# Patient Record
Sex: FEMALE | Race: WHITE | NOT HISPANIC OR LATINO | URBAN - METROPOLITAN AREA
[De-identification: names, ages, dates, MRNs, and addresses within clinical notes are randomized per-mention and may not be internally consistent; named-entity substitution may affect disease eponyms.]

---

## 2020-06-09 ENCOUNTER — OFFICE VISIT (OUTPATIENT)
Dept: URBAN - METROPOLITAN AREA CLINIC 54 | Facility: CLINIC | Age: 70
End: 2020-06-09
Payer: MEDICARE

## 2020-06-09 DIAGNOSIS — R63.4 ABNORMAL WEIGHT LOSS: ICD-10-CM

## 2020-06-09 DIAGNOSIS — E46 MALNUTRITION, UNSPECIFIED TYPE: ICD-10-CM

## 2020-06-09 DIAGNOSIS — K90.2 BLIND LOOP SYNDROME: ICD-10-CM

## 2020-06-09 DIAGNOSIS — K74.0 LIVER FIBROSIS: ICD-10-CM

## 2020-06-09 DIAGNOSIS — R19.7 ACUTE DIARRHEA: ICD-10-CM

## 2020-06-09 DIAGNOSIS — N18.4 CKD (CHRONIC KIDNEY DISEASE) STAGE 4, GFR 15-29 ML/MIN: ICD-10-CM

## 2020-06-09 DIAGNOSIS — A04.72 CLOSTRIDIUM DIFFICILE COLITIS: ICD-10-CM

## 2020-06-09 DIAGNOSIS — K63.89 BACTERIAL OVERGROWTH SYNDROME: ICD-10-CM

## 2020-06-09 DIAGNOSIS — K52.9 CHRONIC DIARRHEA: ICD-10-CM

## 2020-06-09 DIAGNOSIS — N20.0 KIDNEY STONE: ICD-10-CM

## 2020-06-09 DIAGNOSIS — Z98.84 GASTRIC BYPASS STATUS FOR OBESITY: ICD-10-CM

## 2020-06-09 DIAGNOSIS — K59.9 MALDIGESTION SYNDROME: ICD-10-CM

## 2020-06-09 PROCEDURE — 99214 OFFICE O/P EST MOD 30 MIN: CPT | Performed by: INTERNAL MEDICINE

## 2020-06-09 PROCEDURE — G9903 PT SCRN TBCO ID AS NON USER: HCPCS | Performed by: INTERNAL MEDICINE

## 2020-06-09 PROCEDURE — 1036F TOBACCO NON-USER: CPT | Performed by: INTERNAL MEDICINE

## 2020-06-09 RX ORDER — MEGESTEROL ACETATE 125 MG/ML
TAKE 5 MILLILITERS (625 MG) BY ORAL ROUTE EVERY 24 HOURS FOR 30 DAYS SUSPENSION ORAL 1
Qty: 150 | Refills: 1 | Status: ACTIVE | COMMUNITY
Start: 2020-05-11 | End: 2020-07-10

## 2020-06-09 RX ORDER — RIFAXIMIN 550 MG/1
TAKE 1 TABLET (550 MG) BY ORAL ROUTE 2 TIMES PER DAY FOR 90 DAYS TABLET ORAL 2
Qty: 180 | Refills: 3 | Status: ACTIVE | COMMUNITY
Start: 2020-04-20 | End: 2021-04-15

## 2020-06-09 RX ORDER — LEVOTHYROXINE SODIUM 0.15 MG/1
TABLET ORAL
Qty: 0 | Refills: 0 | Status: ACTIVE | COMMUNITY
Start: 1900-01-01 | End: 1900-01-01

## 2020-06-09 NOTE — PHYSICAL EXAM GASTROINTESTINAL
Abdomen , soft, nontender, nondistended , no guarding or rigidity , no masses palpable , normal bowel sounds, healed scar , Liver and Spleen , no hepatomegaly present , no hepatosplenomegaly , liver nontender , spleen not palpable

## 2020-06-09 NOTE — HPI-OTHER HISTORIES
This is a follow-up appointment for this patient, a 69 year old /White female, after a previous visit on hospitalization at [facility]. Pt with hx of CKD stage 5, off HD (refused). REcurrent CDI with 3 rounds of  vancomycin. Still with diarrhea. Some improvement with steroids., for an evaluation for diarrhea. The patient has been experiencing these symptoms for months.  Diagnostic testing includes a flexible sigmoidoscopy. The flexible sigmoidoscopy was performed on 02/23/2020 by Anamika Stockton MD and was normal.    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 on steroid taper  4-2-20 S/p FMT with persistent diarrhea. Pt reports that the bowel movements went up to 14. Pt increased prednisone.  Pt reports that she then went 4-6 per day.  Pt reports 3 stools per day. Pt reports that ID indicated that the patient would not be approved.  Pt reports that  Follow Up 4/20/20: Patient presents for routine post hospitalization telehealth visit. Patient complains of rectal outlet pain which is worse during and after a BM. No abdominal pain. She has used steroid suppositories and NTG/Lidocaine. Diarrhea is ? better with Creon 3K 3 caps with each meal and 2 with snack. FMT did not work. She is using Imodium as needed. Flagyl has been discontinued. Weight is down to 120 lbs. Liver biopsy showed F3 disease.  Follow Up 5/11/20: Patient presents for routine telehealth visit. Patient feels better. She has not taken Creon for 2 weeks due to coverage issues. This made no difference. She tried the 4 caps/meal dose without any significant improvement. However, she wants to try for 2 weeks with drug assistance. She has gained 2 lbs. She is managing symptoms with Imodium. She c/o bloating and gas. She still has rectal pain but improved by 40%.  Follow Up 6/9/20: Patient presents for routine follow up. She continues to have diarrhea and rectal burning despite using the NTG/Lidocaine ointments. She has intermittent incontinence episodes. She was in ER 2 weeks ago for chest pains and dyspnea. She was found to have Hb of 8.4 and received 1  U of PRBC with improvement in symptoms. She had EKG and negative cardiac enzymes. She has MPI in next fee weeks. She is back on Creon 3 caps with each meal. She was started on Rifaximin in May 2020 with improvement in bloating.

## 2020-06-15 ENCOUNTER — OFFICE VISIT (OUTPATIENT)
Dept: URBAN - METROPOLITAN AREA CLINIC 54 | Facility: CLINIC | Age: 70
End: 2020-06-15

## 2020-06-17 ENCOUNTER — LAB OUTSIDE AN ENCOUNTER (OUTPATIENT)
Dept: URBAN - METROPOLITAN AREA CLINIC 54 | Facility: CLINIC | Age: 70
End: 2020-06-17

## 2020-06-21 LAB
5-HIAA, URINE, 24HR: 1.8
5-HIAA, URINE: 1.4

## 2020-06-22 ENCOUNTER — TELEPHONE ENCOUNTER (OUTPATIENT)
Dept: URBAN - METROPOLITAN AREA CLINIC 54 | Facility: CLINIC | Age: 70
End: 2020-06-22

## 2020-06-22 RX ORDER — DIPHENOXYLATE HYDROCHLORIDE AND ATROPINE SULFATE 2.5; .025 MG/1; MG/1
1 TABLET AS NEEDED TABLET ORAL
Qty: 60 TABLET | Refills: 1 | OUTPATIENT
Start: 2020-06-23 | End: 2020-08-22

## 2020-06-22 RX ORDER — MEGESTEROL ACETATE 125 MG/ML
TAKE 5 MILLILITERS (625 MG) BY ORAL ROUTE EVERY 24 HOURS FOR 30 DAYS SUSPENSION ORAL 1
Qty: 150 | Refills: 1 | Status: ACTIVE | COMMUNITY
Start: 2020-05-11 | End: 2020-07-10

## 2020-06-22 RX ORDER — RIFAXIMIN 550 MG/1
TAKE 1 TABLET (550 MG) BY ORAL ROUTE 2 TIMES PER DAY FOR 90 DAYS TABLET ORAL 2
Qty: 180 | Refills: 3 | Status: ACTIVE | COMMUNITY
Start: 2020-04-20 | End: 2021-04-15

## 2020-06-22 RX ORDER — LEVOTHYROXINE SODIUM 0.15 MG/1
TABLET ORAL
Qty: 0 | Refills: 0 | Status: ACTIVE | COMMUNITY
Start: 1900-01-01 | End: 1900-01-01

## 2020-06-25 ENCOUNTER — TELEPHONE ENCOUNTER (OUTPATIENT)
Dept: URBAN - METROPOLITAN AREA CLINIC 54 | Facility: CLINIC | Age: 70
End: 2020-06-25

## 2020-06-28 LAB
A/G RATIO: 1.9
ALBUMIN: 3.6
ALKALINE PHOSPHATASE: 70
ALT (SGPT): 31
AST (SGOT): 21
BASO (ABSOLUTE): 0
BASOS: 0
BILIRUBIN, TOTAL: 0.2
BUN/CREATININE RATIO: 14
BUN: 34
CALCIUM: 8.8
CARBON DIOXIDE, TOTAL: 15
CHLORIDE: 113
CHROMOGRANIN A: 8945
CREATININE: 2.4
EGFR IF AFRICN AM: 23
EGFR IF NONAFRICN AM: 20
EOS (ABSOLUTE): 0.1
EOS: 1
GASTRIN, SERUM: 179
GLOBULIN, TOTAL: 1.9
GLUCOSE: 95
HEMATOCRIT: 33.3
HEMATOLOGY COMMENTS:: (no result)
HEMOGLOBIN: 10.3
IMMATURE CELLS: (no result)
IMMATURE GRANS (ABS): 0
IMMATURE GRANULOCYTES: 0
LYMPHS (ABSOLUTE): 1.9
LYMPHS: 27
MCH: 28.4
MCHC: 30.9
MCV: 92
MONOCYTES(ABSOLUTE): 0.3
MONOCYTES: 5
NEUTROPHILS (ABSOLUTE): 4.7
NEUTROPHILS: 67
NRBC: (no result)
PLATELETS: 169
POTASSIUM: 4.2
PROTEIN, TOTAL: 5.5
RBC: 3.63
RDW: 14.7
SODIUM: 139
SOMATOSTATIN: 34
VIP, PLASMA: 46.7
WBC: 6.9
ZINC, PLASMA OR SERUM: 70

## 2020-07-01 ENCOUNTER — TELEPHONE ENCOUNTER (OUTPATIENT)
Dept: URBAN - METROPOLITAN AREA CLINIC 92 | Facility: CLINIC | Age: 70
End: 2020-07-01

## 2020-07-06 ENCOUNTER — TELEPHONE ENCOUNTER (OUTPATIENT)
Dept: URBAN - METROPOLITAN AREA CLINIC 54 | Facility: CLINIC | Age: 70
End: 2020-07-06

## 2020-07-20 ENCOUNTER — OFFICE VISIT (OUTPATIENT)
Dept: URBAN - METROPOLITAN AREA CLINIC 54 | Facility: CLINIC | Age: 70
End: 2020-07-20
Payer: MEDICARE

## 2020-07-20 DIAGNOSIS — R19.7 ACUTE DIARRHEA: ICD-10-CM

## 2020-07-20 DIAGNOSIS — K62.89 ANAL OR RECTAL PAIN: ICD-10-CM

## 2020-07-20 PROCEDURE — 99214 OFFICE O/P EST MOD 30 MIN: CPT | Performed by: INTERNAL MEDICINE

## 2020-07-20 RX ORDER — DIPHENOXYLATE HYDROCHLORIDE AND ATROPINE SULFATE 2.5; .025 MG/1; MG/1
1 TABLET AS NEEDED TABLET ORAL
Qty: 60 TABLET | Refills: 1 | Status: ACTIVE | COMMUNITY
Start: 2020-06-23 | End: 2020-08-22

## 2020-07-20 RX ORDER — LEVOTHYROXINE SODIUM 0.15 MG/1
TABLET ORAL
Qty: 0 | Refills: 0 | Status: ACTIVE | COMMUNITY
Start: 1900-01-01

## 2020-07-20 RX ORDER — DIPHENOXYLATE HYDROCHLORIDE AND ATROPINE SULFATE 2.5; .025 MG/1; MG/1
2 TABLETS AS NEEDED TABLET ORAL
Qty: 120 TABLET | Refills: 1
Start: 2020-06-23 | End: 2020-08-22

## 2020-07-20 RX ORDER — RIFAXIMIN 550 MG/1
TAKE 1 TABLET (550 MG) BY ORAL ROUTE 2 TIMES PER DAY FOR 90 DAYS TABLET ORAL 2
Qty: 180 | Refills: 3 | Status: ACTIVE | COMMUNITY
Start: 2020-04-20 | End: 2021-04-15

## 2020-08-19 ENCOUNTER — TELEPHONE ENCOUNTER (OUTPATIENT)
Dept: URBAN - METROPOLITAN AREA CLINIC 54 | Facility: CLINIC | Age: 70
End: 2020-08-19

## 2020-08-24 ENCOUNTER — OUT OF OFFICE VISIT (OUTPATIENT)
Dept: URBAN - METROPOLITAN AREA MEDICAL CENTER 23 | Facility: MEDICAL CENTER | Age: 70
End: 2020-08-24
Payer: MEDICARE

## 2020-08-24 DIAGNOSIS — K52.9 ACUTE COLITIS: ICD-10-CM

## 2020-08-24 DIAGNOSIS — U07.1 COVID-19: ICD-10-CM

## 2020-08-24 DIAGNOSIS — R63.4 ABNORMAL INTENTIONAL WEIGHT LOSS: ICD-10-CM

## 2020-08-24 DIAGNOSIS — E46 CALORIC MALNUTRITION: ICD-10-CM

## 2020-08-24 DIAGNOSIS — K74.60 ADVANCED CIRRHOSIS OF LIVER: ICD-10-CM

## 2020-08-24 PROCEDURE — 99233 SBSQ HOSP IP/OBS HIGH 50: CPT | Performed by: INTERNAL MEDICINE

## 2020-08-24 PROCEDURE — G8427 DOCREV CUR MEDS BY ELIG CLIN: HCPCS | Performed by: INTERNAL MEDICINE

## 2020-08-24 PROCEDURE — 99223 1ST HOSP IP/OBS HIGH 75: CPT | Performed by: INTERNAL MEDICINE

## 2020-10-09 ENCOUNTER — OFFICE VISIT (OUTPATIENT)
Dept: URBAN - METROPOLITAN AREA CLINIC 54 | Facility: CLINIC | Age: 70
End: 2020-10-09
Payer: MEDICARE

## 2020-10-09 ENCOUNTER — WEB ENCOUNTER (OUTPATIENT)
Dept: URBAN - METROPOLITAN AREA CLINIC 54 | Facility: CLINIC | Age: 70
End: 2020-10-09

## 2020-10-09 DIAGNOSIS — K74.00 LIVER FIBROSIS: ICD-10-CM

## 2020-10-09 DIAGNOSIS — N18.4 CKD (CHRONIC KIDNEY DISEASE) STAGE 4, GFR 15-29 ML/MIN: ICD-10-CM

## 2020-10-09 DIAGNOSIS — K52.9 CHRONIC DIARRHEA: ICD-10-CM

## 2020-10-09 DIAGNOSIS — A04.72 CLOSTRIDIUM DIFFICILE COLITIS: ICD-10-CM

## 2020-10-09 PROCEDURE — 99213 OFFICE O/P EST LOW 20 MIN: CPT | Performed by: INTERNAL MEDICINE

## 2020-10-09 RX ORDER — RIFAXIMIN 550 MG/1
TAKE 1 TABLET (550 MG) BY ORAL ROUTE 2 TIMES PER DAY FOR 90 DAYS TABLET ORAL 2
Qty: 180 | Refills: 3 | Status: ACTIVE | COMMUNITY
Start: 2020-04-20 | End: 2021-04-15

## 2020-10-09 RX ORDER — LEVOTHYROXINE SODIUM 0.15 MG/1
TABLET ORAL
Qty: 0 | Refills: 0 | Status: ACTIVE | COMMUNITY
Start: 1900-01-01

## 2020-10-09 RX ORDER — IMMUNE GLOB/PLASMA FRA BOVINE 5 G
AS DIRECTED POWDER IN PACKET (EA) ORAL QD
Qty: 1 BOX | Refills: 3 | OUTPATIENT
Start: 2020-10-09 | End: 2021-02-05

## 2020-10-09 NOTE — HPI-OTHER HISTORIES
This is a follow-up appointment for this patient, a 69 year old /White female, after a previous visit on hospitalization at [facility]. Pt with hx of CKD stage 5, off HD (refused). REcurrent CDI with 3 rounds of  vancomycin. Still with diarrhea. Some improvement with steroids., for an evaluation for diarrhea. The patient    has been experiencing these symptoms for months.  Diagnostic testing includes a flexible sigmoidoscopy. The flexible sigmoidoscopy was performed on 02/23/2020 by Anamika Stockton MD and was normal.    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 on steroid taper  4-2-20 S/p FMT with persistent diarrhea. Pt reports that the bowel movements went up to 14. Pt increased prednisone.  Pt reports that she then went 4-6 per day.  Pt reports 3 stools per day. Pt reports that ID indicated that the patient would not be approved.  Pt reports that  Follow Up 4/20/20: Patient presents for routine post hospitalization telehealth visit. Patient complains of rectal outlet pain which is worse during and after a BM. No abdominal pain. She has used steroid suppositories and NTG/Lidocaine. Diarrhea is ? better with Creon 3K 3 caps with each meal and 2 with snack. FMT did not work. She is using Imodium as needed. Flagyl has been discontinued. Weight is down to 120 lbs. Liver biopsy showed F3 disease.  Follow Up 5/11/20: Patient presents for routine telehealth visit. Patient feels better. She has not taken Creon for 2 weeks due to coverage issues. This made no difference. She tried the 4 caps/meal dose without any significant improvement. However, she wants to try for 2 weeks with drug assistance. She has gained 2 lbs. She is managing symptoms with Imodium. She c/o bloating and gas. She still has rectal pain but improved by 40%.  Follow Up 6/9/20: Patient presents for routine follow up. She continues to have diarrhea and rectal burning despite using the NTG/Lidocaine ointments. She has intermittent incontinence episodes. She was in ER 2 weeks ago for chest pains and dyspnea. She was found to have Hb of 8.4 and received 1  U of PRBC with improvement in symptoms. She had EKG and negative cardiac enzymes. She has MPI in next few weeks. She is back on Creon 3 caps with each meal. She was started on Rifaximin in May 2020 with improvement in bloating.  Follow Up 7/20/20: Patient presents for unexpected visit. She c/o severe rectal pain and swelling which is quite bothersome. She is unable to use NTG/Lidocaine and Sitz baths are not helping. Diarrhea persists. PET NETSPOT CT is pending. She is awaiting [place] appointment.   Follow Up 10/9/20: Patient presents for routine follow up. She saw Dr. Garcia and diaganosed with anal fissure and stenosis. She had Botox and dilation. She is using Sitz baths. She was diagosed with Covid in Aug 2020. Recent blood work showed Cr of 2.6 and Al 2.6 with proteinuria. She is managing her diarrhea with Lomotil and has stopped the Creon.  She is on Rifaximin 550 mg po BID. She has improvement with bloating.

## 2020-10-21 ENCOUNTER — TELEPHONE ENCOUNTER (OUTPATIENT)
Dept: URBAN - METROPOLITAN AREA CLINIC 54 | Facility: CLINIC | Age: 70
End: 2020-10-21

## 2020-10-21 RX ORDER — DIPHENOXYLATE HYDROCHLORIDE AND ATROPINE SULFATE 2.5; .025 MG/1; MG/1
2 TABLETS AS NEEDED TABLET ORAL
Qty: 120 TABLET | Refills: 1
Start: 2020-06-23

## 2021-01-27 ENCOUNTER — TELEPHONE ENCOUNTER (OUTPATIENT)
Dept: URBAN - METROPOLITAN AREA CLINIC 54 | Facility: CLINIC | Age: 71
End: 2021-01-27

## 2021-02-15 ENCOUNTER — OFFICE VISIT (OUTPATIENT)
Dept: URBAN - METROPOLITAN AREA CLINIC 54 | Facility: CLINIC | Age: 71
End: 2021-02-15

## 2021-04-16 ENCOUNTER — WEB ENCOUNTER (OUTPATIENT)
Dept: URBAN - METROPOLITAN AREA CLINIC 54 | Facility: CLINIC | Age: 71
End: 2021-04-16

## 2021-04-16 ENCOUNTER — LAB OUTSIDE AN ENCOUNTER (OUTPATIENT)
Dept: URBAN - METROPOLITAN AREA CLINIC 54 | Facility: CLINIC | Age: 71
End: 2021-04-16

## 2021-04-16 ENCOUNTER — OFFICE VISIT (OUTPATIENT)
Dept: URBAN - METROPOLITAN AREA CLINIC 54 | Facility: CLINIC | Age: 71
End: 2021-04-16
Payer: MEDICARE

## 2021-04-16 DIAGNOSIS — K63.89 BACTERIAL OVERGROWTH SYNDROME: ICD-10-CM

## 2021-04-16 DIAGNOSIS — K74.0 LIVER FIBROSIS: ICD-10-CM

## 2021-04-16 DIAGNOSIS — K60.1 CHRONIC ANAL FISSURE: ICD-10-CM

## 2021-04-16 DIAGNOSIS — K52.9 CHRONIC DIARRHEA: ICD-10-CM

## 2021-04-16 PROCEDURE — 99214 OFFICE O/P EST MOD 30 MIN: CPT | Performed by: INTERNAL MEDICINE

## 2021-04-16 RX ORDER — DIPHENOXYLATE HYDROCHLORIDE AND ATROPINE SULFATE 2.5; .025 MG/1; MG/1
2 TABLETS AS NEEDED TABLET ORAL
Qty: 120 TABLET | Refills: 1 | Status: ACTIVE | COMMUNITY
Start: 2020-06-23

## 2021-04-16 RX ORDER — LEVOTHYROXINE SODIUM 0.15 MG/1
TABLET ORAL
Qty: 0 | Refills: 0 | Status: ACTIVE | COMMUNITY
Start: 1900-01-01

## 2021-04-16 RX ORDER — METRONIDAZOLE 500 MG/1
1 TABLET TABLET, FILM COATED ORAL ONCE A DAY
Qty: 90 TABLET | Refills: 3 | OUTPATIENT
Start: 2021-04-16 | End: 2022-04-11

## 2021-04-16 NOTE — HPI-OTHER HISTORIES
This is a follow-up appointment for this patient, a 69 year old /White female, after a previous visit on hospitalization at [facility]. Pt with hx of CKD stage 5, off HD (refused). REcurrent CDI with 3 rounds of  vancomycin. Still with diarrhea. Some improvement with steroids., for an evaluation for diarrhea. The patient    has been experiencing these symptoms for months.  Diagnostic testing includes a flexible sigmoidoscopy. The flexible sigmoidoscopy was performed on 02/23/2020 by Anamika Stockton MD and was normal.    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 on steroid taper  4-2-20 S/p FMT with persistent diarrhea. Pt reports that the bowel movements went up to 14. Pt increased prednisone.  Pt reports that she then went 4-6 per day.  Pt reports 3 stools per day. Pt reports that ID indicated that the patient would not be approved.  Pt reports that  Follow Up 4/20/20: Patient presents for routine post hospitalization telehealth visit. Patient complains of rectal outlet pain which is worse during and after a BM. No abdominal pain. She has used steroid suppositories and NTG/Lidocaine. Diarrhea is ? better with Creon 3K 3 caps with each meal and 2 with snack. FMT did not work. She is using Imodium as needed. Flagyl has been discontinued. Weight is down to 120 lbs. Liver biopsy showed F3 disease.  Follow Up 5/11/20: Patient presents for routine telehealth visit. Patient feels better. She has not taken Creon for 2 weeks due to coverage issues. This made no difference. She tried the 4 caps/meal dose without any significant improvement. However, she wants to try for 2 weeks with drug assistance. She has gained 2 lbs. She is managing symptoms with Imodium. She c/o bloating and gas. She still has rectal pain but improved by 40%.  Follow Up 6/9/20: Patient presents for routine follow up. She continues to have diarrhea and rectal burning despite using the NTG/Lidocaine ointments. She has intermittent incontinence episodes. She was in ER 2 weeks ago for chest pains and dyspnea. She was found to have Hb of 8.4 and received 1  U of PRBC with improvement in symptoms. She had EKG and negative cardiac enzymes. She has MPI in next few weeks. She is back on Creon 3 caps with each meal. She was started on Rifaximin in May 2020 with improvement in bloating.  Follow Up 7/20/20: Patient presents for unexpected visit. She c/o severe rectal pain and swelling which is quite bothersome. She is unable to use NTG/Lidocaine and Sitz baths are not helping. Diarrhea persists. PET NETSPOT CT is pending. She is awaiting [place] appointment.   Follow Up 10/9/20: Patient presents for routine follow up. She saw Dr. Garcia and diaganosed with anal fissure and stenosis. She had Botox and dilation. She is using Sitz baths. She was diagosed with Covid in Aug 2020. Recent blood work showed Cr of 2.6 and Al 2.6 with proteinuria. She is managing her diarrhea with Lomotil and has stopped the Creon.  She is on Rifaximin 550 mg po BID. She has improvement with bloating.  Follow Up 4/16/21: Patient presents for routine follow up. She received 2 units of PRBC's for Hb of 8.2 and iron infusion. She had a interstim device placed in Feb 2021 which has improved her FI. She has excessive flatulence and bloating. She stopped Rifaximin and Creon due to lack of benefit. She is not using any medication for diarrha. She tried Candibactin and FCCidal herbal therapy with no relief.

## 2021-04-18 LAB
A/G RATIO: 1.5
AFP, SERUM, TUMOR MARKER: 6.8
ALBUMIN: 3.4
ALKALINE PHOSPHATASE: 137
ALT (SGPT): 21
AST (SGOT): 17
BASO (ABSOLUTE): 0
BASOS: 1
BILIRUBIN, TOTAL: 0.3
BUN/CREATININE RATIO: 19
BUN: 45
CALCIUM: 8.5
CARBON DIOXIDE, TOTAL: 20
CHLORIDE: 109
CREATININE: 2.33
EGFR IF AFRICN AM: 24
EGFR IF NONAFRICN AM: 20
EOS (ABSOLUTE): 0.2
EOS: 3
GLOBULIN, TOTAL: 2.2
GLUCOSE: 118
HEMATOCRIT: 39.9
HEMATOLOGY COMMENTS:: (no result)
HEMOGLOBIN: 12.5
IMMATURE CELLS: (no result)
IMMATURE GRANS (ABS): 0
IMMATURE GRANULOCYTES: 0
INR: 1.1
LYMPHS (ABSOLUTE): 1.2
LYMPHS: 21
MCH: 29.8
MCHC: 31.3
MCV: 95
MONOCYTES(ABSOLUTE): 0.4
MONOCYTES: 8
NEUTROPHILS (ABSOLUTE): 3.7
NEUTROPHILS: 67
NRBC: (no result)
PLATELETS: 186
POTASSIUM: 4
PROTEIN, TOTAL: 5.6
PROTHROMBIN TIME: 11.3
RBC: 4.19
RDW: 13.4
SODIUM: 143
WBC: 5.5

## 2021-10-15 ENCOUNTER — OFFICE VISIT (OUTPATIENT)
Dept: URBAN - METROPOLITAN AREA CLINIC 54 | Facility: CLINIC | Age: 71
End: 2021-10-15
Payer: MEDICARE

## 2021-10-15 DIAGNOSIS — E46 MALNUTRITION, UNSPECIFIED TYPE: ICD-10-CM

## 2021-10-15 DIAGNOSIS — K74.0 LIVER FIBROSIS: ICD-10-CM

## 2021-10-15 DIAGNOSIS — N18.4 CKD (CHRONIC KIDNEY DISEASE) STAGE 4, GFR 15-29 ML/MIN: ICD-10-CM

## 2021-10-15 DIAGNOSIS — K60.1 CHRONIC ANAL FISSURE: ICD-10-CM

## 2021-10-15 DIAGNOSIS — K52.9 CHRONIC DIARRHEA: ICD-10-CM

## 2021-10-15 DIAGNOSIS — R63.4 ABNORMAL WEIGHT LOSS: ICD-10-CM

## 2021-10-15 DIAGNOSIS — A04.72 CLOSTRIDIUM DIFFICILE COLITIS: ICD-10-CM

## 2021-10-15 DIAGNOSIS — K63.89 BACTERIAL OVERGROWTH SYNDROME: ICD-10-CM

## 2021-10-15 DIAGNOSIS — R94.5 ABNORMAL LFTS: ICD-10-CM

## 2021-10-15 DIAGNOSIS — N20.0 KIDNEY STONE: ICD-10-CM

## 2021-10-15 DIAGNOSIS — K90.2 BLIND LOOP SYNDROME: ICD-10-CM

## 2021-10-15 DIAGNOSIS — Z98.84 GASTRIC BYPASS STATUS FOR OBESITY: ICD-10-CM

## 2021-10-15 DIAGNOSIS — K59.9 MALDIGESTION SYNDROME: ICD-10-CM

## 2021-10-15 DIAGNOSIS — R89.8 ELEVATED TUMOR MARKERS: ICD-10-CM

## 2021-10-15 PROCEDURE — 99214 OFFICE O/P EST MOD 30 MIN: CPT | Performed by: INTERNAL MEDICINE

## 2021-10-15 RX ORDER — HYDROXYZINE HYDROCHLORIDE 10 MG/1
2 TABLET, FILM COATED ORAL QHS
Status: ACTIVE | COMMUNITY

## 2021-10-15 RX ORDER — METRONIDAZOLE 500 MG/1
1 TABLET TABLET, FILM COATED ORAL ONCE A DAY
Qty: 90 TABLET | Refills: 3 | OUTPATIENT

## 2021-10-15 RX ORDER — FUROSEMIDE 40 MG/1
1 TABLET TABLET ORAL ONCE A DAY
Status: ACTIVE | COMMUNITY

## 2021-10-15 RX ORDER — LEVOTHYROXINE SODIUM 0.15 MG/1
TABLET ORAL
Qty: 0 | Refills: 0 | Status: ACTIVE | COMMUNITY
Start: 1900-01-01

## 2021-10-15 RX ORDER — METRONIDAZOLE 500 MG/1
1 TABLET TABLET, FILM COATED ORAL ONCE A DAY
Qty: 90 TABLET | Refills: 3 | Status: ACTIVE | COMMUNITY
Start: 2021-04-16 | End: 2022-04-11

## 2021-10-15 RX ORDER — DIPHENOXYLATE HYDROCHLORIDE AND ATROPINE SULFATE 2.5; .025 MG/1; MG/1
2 TABLETS AS NEEDED TABLET ORAL
Qty: 120 TABLET | Refills: 1 | Status: ACTIVE | COMMUNITY
Start: 2020-06-23

## 2021-10-15 NOTE — PHYSICAL EXAM CONSTITUTIONAL:
Improved nutrition , in no acute distress , ambulating without difficulty , normal communication ability

## 2021-10-15 NOTE — HPI-OTHER HISTORIES
This is a follow-up appointment for this patient, a 69 year old /White female, after a previous visit on hospitalization at [facility]. Pt with hx of CKD stage 5, off HD (refused). REcurrent CDI with 3 rounds of  vancomycin. Still with diarrhea. Some improvement with steroids., for an evaluation for diarrhea. The patient    has been experiencing these symptoms for months.  Diagnostic testing includes a flexible sigmoidoscopy. The flexible sigmoidoscopy was performed on 02/23/2020 by Anamika Stockton MD and was normal.    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 on steroid taper  4-2-20 S/p FMT with persistent diarrhea. Pt reports that the bowel movements went up to 14. Pt increased prednisone.  Pt reports that she then went 4-6 per day.  Pt reports 3 stools per day. Pt reports that ID indicated that the patient would not be approved.  Pt reports that  Follow Up 4/20/20: Patient presents for routine post hospitalization telehealth visit. Patient complains of rectal outlet pain which is worse during and after a BM. No abdominal pain. She has used steroid suppositories and NTG/Lidocaine. Diarrhea is ? better with Creon 3K 3 caps with each meal and 2 with snack. FMT did not work. She is using Imodium as needed. Flagyl has been discontinued. Weight is down to 120 lbs. Liver biopsy showed F3 disease.  Follow Up 5/11/20: Patient presents for routine telehealth visit. Patient feels better. She has not taken Creon for 2 weeks due to coverage issues. This made no difference. She tried the 4 caps/meal dose without any significant improvement. However, she wants to try for 2 weeks with drug assistance. She has gained 2 lbs. She is managing symptoms with Imodium. She c/o bloating and gas. She still has rectal pain but improved by 40%.  Follow Up 6/9/20: Patient presents for routine follow up. She continues to have diarrhea and rectal burning despite using the NTG/Lidocaine ointments. She has intermittent incontinence episodes. She was in ER 2 weeks ago for chest pains and dyspnea. She was found to have Hb of 8.4 and received 1  U of PRBC with improvement in symptoms. She had EKG and negative cardiac enzymes. She has MPI in next few weeks. She is back on Creon 3 caps with each meal. She was started on Rifaximin in May 2020 with improvement in bloating.  Follow Up 7/20/20: Patient presents for unexpected visit. She c/o severe rectal pain and swelling which is quite bothersome. She is unable to use NTG/Lidocaine and Sitz baths are not helping. Diarrhea persists. PET NETSPOT CT is pending. She is awaiting [place] appointment.   Follow Up 10/9/20: Patient presents for routine follow up. She saw Dr. Garcia and diaganosed with anal fissure and stenosis. She had Botox and dilation. She is using Sitz baths. She was diagosed with Covid in Aug 2020. Recent blood work showed Cr of 2.6 and Al 2.6 with proteinuria. She is managing her diarrhea with Lomotil and has stopped the Creon.  She is on Rifaximin 550 mg po BID. She has improvement with bloating.  Follow Up 4/16/21: Patient presents for routine follow up. She received 2 units of PRBC's for Hb of 8.2 and iron infusion. She had a interstim device placed in Feb 2021 which has improved her FI. She has excessive flatulence and bloating. She stopped Rifaximin and Creon due to lack of benefit. She is not using any medication for diarrha. She tried Candibactin and FCCidal herbal therapy with no relief.  Follow Up 10/15/21: Patient presents for routine follow up. She has done really well over the past year. Her symptoms are under control with Flagyl. She takes Prilosec PRN for GERD. She has gained 13 lbs over past year. She is taking Lasix for edema. Appetite is good. She is working at a local [employer] place.

## 2021-10-16 LAB
A/G RATIO: 2.4
ALBUMIN: 4
ALKALINE PHOSPHATASE: 147
ALT (SGPT): 14
AST (SGOT): 18
BASO (ABSOLUTE): 0.1
BASOS: 1
BILIRUBIN, TOTAL: 0.3
BUN/CREATININE RATIO: 23
BUN: 67
CALCIUM: 9
CARBON DIOXIDE, TOTAL: 19
CHLORIDE: 107
CREATININE: 2.97
EGFR IF AFRICN AM: 17
EGFR IF NONAFRICN AM: 15
EOS (ABSOLUTE): 0.3
EOS: 4
GLOBULIN, TOTAL: 1.7
GLUCOSE: 105
HEMATOCRIT: 30.6
HEMATOLOGY COMMENTS:: (no result)
HEMOGLOBIN: 9.2
IMMATURE CELLS: (no result)
IMMATURE GRANS (ABS): 0
IMMATURE GRANULOCYTES: 0
INR: 2.6
LYMPHS (ABSOLUTE): 1.4
LYMPHS: 21
MCH: 27.8
MCHC: 30.1
MCV: 92
MONOCYTES(ABSOLUTE): 0.4
MONOCYTES: 7
NEUTROPHILS (ABSOLUTE): 4.5
NEUTROPHILS: 67
NRBC: (no result)
PLATELETS: 210
POTASSIUM: 4.4
PROTEIN, TOTAL: 5.7
PROTHROMBIN TIME: 26.3
RBC: 3.31
RDW: 14
SODIUM: 141
WBC: 6.6

## 2021-10-18 ENCOUNTER — TELEPHONE ENCOUNTER (OUTPATIENT)
Dept: URBAN - METROPOLITAN AREA CLINIC 92 | Facility: CLINIC | Age: 71
End: 2021-10-18

## 2021-10-18 RX ORDER — FUROSEMIDE 40 MG/1
1 TABLET TABLET ORAL ONCE A DAY
Status: ACTIVE | COMMUNITY

## 2021-10-18 RX ORDER — LEVOTHYROXINE SODIUM 0.15 MG/1
TABLET ORAL
Qty: 0 | Refills: 0 | Status: ACTIVE | COMMUNITY
Start: 1900-01-01

## 2021-10-18 RX ORDER — HYDROXYZINE HYDROCHLORIDE 10 MG/1
2 TABLET, FILM COATED ORAL QHS
Status: ACTIVE | COMMUNITY

## 2021-10-18 RX ORDER — DIPHENOXYLATE HYDROCHLORIDE AND ATROPINE SULFATE 2.5; .025 MG/1; MG/1
2 TABLETS AS NEEDED TABLET ORAL
Qty: 120 TABLET | Refills: 1 | Status: ACTIVE | COMMUNITY
Start: 2020-06-23

## 2021-10-18 RX ORDER — METRONIDAZOLE 500 MG/1
1 TABLET TABLET, FILM COATED ORAL ONCE A DAY
Qty: 90 TABLET | Refills: 3 | Status: ACTIVE | COMMUNITY

## 2021-12-14 ENCOUNTER — TELEPHONE ENCOUNTER (OUTPATIENT)
Dept: URBAN - METROPOLITAN AREA CLINIC 54 | Facility: CLINIC | Age: 71
End: 2021-12-14

## 2021-12-21 ENCOUNTER — TELEPHONE ENCOUNTER (OUTPATIENT)
Dept: URBAN - METROPOLITAN AREA CLINIC 54 | Facility: CLINIC | Age: 71
End: 2021-12-21

## 2021-12-21 PROBLEM — 300331000: Status: ACTIVE | Noted: 2021-12-14

## 2021-12-29 ENCOUNTER — LAB OUTSIDE AN ENCOUNTER (OUTPATIENT)
Dept: URBAN - METROPOLITAN AREA CLINIC 54 | Facility: CLINIC | Age: 71
End: 2021-12-29

## 2021-12-29 LAB
CREATININE POC: 2.5
PERFORMING LAB: (no result)

## 2021-12-30 ENCOUNTER — TELEPHONE ENCOUNTER (OUTPATIENT)
Dept: URBAN - METROPOLITAN AREA CLINIC 54 | Facility: CLINIC | Age: 71
End: 2021-12-30

## 2022-01-04 ENCOUNTER — TELEPHONE ENCOUNTER (OUTPATIENT)
Dept: URBAN - METROPOLITAN AREA CLINIC 92 | Facility: CLINIC | Age: 72
End: 2022-01-04

## 2022-01-04 ENCOUNTER — TELEPHONE ENCOUNTER (OUTPATIENT)
Dept: URBAN - METROPOLITAN AREA CLINIC 23 | Facility: CLINIC | Age: 72
End: 2022-01-04

## 2022-01-04 ENCOUNTER — TELEPHONE ENCOUNTER (OUTPATIENT)
Dept: URBAN - METROPOLITAN AREA CLINIC 54 | Facility: CLINIC | Age: 72
End: 2022-01-04

## 2022-01-24 ENCOUNTER — LAB OUTSIDE AN ENCOUNTER (OUTPATIENT)
Dept: URBAN - METROPOLITAN AREA CLINIC 54 | Facility: CLINIC | Age: 72
End: 2022-01-24

## 2022-02-02 ENCOUNTER — TELEPHONE ENCOUNTER (OUTPATIENT)
Dept: URBAN - METROPOLITAN AREA CLINIC 54 | Facility: CLINIC | Age: 72
End: 2022-02-02

## 2022-02-11 ENCOUNTER — OFFICE VISIT (OUTPATIENT)
Dept: URBAN - METROPOLITAN AREA CLINIC 54 | Facility: CLINIC | Age: 72
End: 2022-02-11

## 2022-03-04 ENCOUNTER — OFFICE VISIT (OUTPATIENT)
Dept: URBAN - METROPOLITAN AREA CLINIC 54 | Facility: CLINIC | Age: 72
End: 2022-03-04
Payer: MEDICARE

## 2022-03-04 VITALS
DIASTOLIC BLOOD PRESSURE: 51 MMHG | HEART RATE: 74 BPM | WEIGHT: 130 LBS | HEIGHT: 69 IN | TEMPERATURE: 97 F | BODY MASS INDEX: 19.26 KG/M2 | SYSTOLIC BLOOD PRESSURE: 80 MMHG

## 2022-03-04 DIAGNOSIS — K60.1 CHRONIC ANAL FISSURE: ICD-10-CM

## 2022-03-04 DIAGNOSIS — K63.89 BACTERIAL OVERGROWTH SYNDROME: ICD-10-CM

## 2022-03-04 DIAGNOSIS — K52.9 CHRONIC DIARRHEA: ICD-10-CM

## 2022-03-04 DIAGNOSIS — C22.0 HCC (HEPATOCELLULAR CARCINOMA): ICD-10-CM

## 2022-03-04 PROCEDURE — 99213 OFFICE O/P EST LOW 20 MIN: CPT | Performed by: INTERNAL MEDICINE

## 2022-03-04 RX ORDER — HYDROXYZINE HYDROCHLORIDE 10 MG/1
2 TABLET, FILM COATED ORAL QHS
Status: ACTIVE | COMMUNITY

## 2022-03-04 RX ORDER — WARFARIN SODIUM 2 MG/1
1 TABLET TABLET ORAL ONCE A DAY
Status: ACTIVE | COMMUNITY

## 2022-03-04 RX ORDER — FUROSEMIDE 40 MG/1
1 TABLET TABLET ORAL ONCE A DAY
Status: ACTIVE | COMMUNITY

## 2022-03-04 RX ORDER — DIPHENOXYLATE HYDROCHLORIDE AND ATROPINE SULFATE 2.5; .025 MG/1; MG/1
2 TABLETS AS NEEDED TABLET ORAL
Qty: 120 TABLET | Refills: 1 | Status: ON HOLD | COMMUNITY
Start: 2020-06-23

## 2022-03-04 RX ORDER — METRONIDAZOLE 500 MG/1
1 TABLET TABLET, FILM COATED ORAL ONCE A DAY
Qty: 90 TABLET | Refills: 3 | OUTPATIENT

## 2022-03-04 RX ORDER — METRONIDAZOLE 500 MG/1
1 TABLET TABLET, FILM COATED ORAL ONCE A DAY
Qty: 90 TABLET | Refills: 3 | Status: ACTIVE | COMMUNITY

## 2022-03-04 RX ORDER — LEVOTHYROXINE SODIUM 0.15 MG/1
TABLET ORAL
Qty: 0 | Refills: 0 | Status: ACTIVE | COMMUNITY
Start: 1900-01-01

## 2022-03-04 NOTE — HPI-OTHER HISTORIES
This is a follow-up appointment for this patient, a 69 year old /White female, after a previous visit on hospitalization at [facility]. Pt with hx of CKD stage 5, off HD (refused). REcurrent CDI with 3 rounds of  vancomycin. Still with diarrhea. Some improvement with steroids., for an evaluation for diarrhea. The patient    has been experiencing these symptoms for months.  Diagnostic testing includes a flexible sigmoidoscopy. The flexible sigmoidoscopy was performed on 02/23/2020 by Anamika Stockton MD and was normal.    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 on steroid taper  4-2-20 S/p FMT with persistent diarrhea. Pt reports that the bowel movements went up to 14. Pt increased prednisone.  Pt reports that she then went 4-6 per day.  Pt reports 3 stools per day. Pt reports that ID indicated that the patient would not be approved.  Pt reports that  Follow Up 4/20/20: Patient presents for routine post hospitalization telehealth visit. Patient complains of rectal outlet pain which is worse during and after a BM. No abdominal pain. She has used steroid suppositories and NTG/Lidocaine. Diarrhea is ? better with Creon 3K 3 caps with each meal and 2 with snack. FMT did not work. She is using Imodium as needed. Flagyl has been discontinued. Weight is down to 120 lbs. Liver biopsy showed F3 disease.  Follow Up 5/11/20: Patient presents for routine telehealth visit. Patient feels better. She has not taken Creon for 2 weeks due to coverage issues. This made no difference. She tried the 4 caps/meal dose without any significant improvement. However, she wants to try for 2 weeks with drug assistance. She has gained 2 lbs. She is managing symptoms with Imodium. She c/o bloating and gas. She still has rectal pain but improved by 40%.  Follow Up 6/9/20: Patient presents for routine follow up. She continues to have diarrhea and rectal burning despite using the NTG/Lidocaine ointments. She has intermittent incontinence episodes. She was in ER 2 weeks ago for chest pains and dyspnea. She was found to have Hb of 8.4 and received 1  U of PRBC with improvement in symptoms. She had EKG and negative cardiac enzymes. She has MPI in next few weeks. She is back on Creon 3 caps with each meal. She was started on Rifaximin in May 2020 with improvement in bloating.  Follow Up 7/20/20: Patient presents for unexpected visit. She c/o severe rectal pain and swelling which is quite bothersome. She is unable to use NTG/Lidocaine and Sitz baths are not helping. Diarrhea persists. PET NETSPOT CT is pending. She is awaiting [place] appointment.   Follow Up 10/9/20: Patient presents for routine follow up. She saw Dr. Garcia and diaganosed with anal fissure and stenosis. She had Botox and dilation. She is using Sitz baths. She was diagosed with Covid in Aug 2020. Recent blood work showed Cr of 2.6 and Al 2.6 with proteinuria. She is managing her diarrhea with Lomotil and has stopped the Creon.  She is on Rifaximin 550 mg po BID. She has improvement with bloating.  Follow Up 4/16/21: Patient presents for routine follow up. She received 2 units of PRBC's for Hb of 8.2 and iron infusion. She had a interstim device placed in Feb 2021 which has improved her FI. She has excessive flatulence and bloating. She stopped Rifaximin and Creon due to lack of benefit. She is not using any medication for diarrha. She tried Candibactin and FCCidal herbal therapy with no relief.  Follow Up 10/15/21: Patient presents for routine follow up. She has done really well over the past year. Her symptoms are under control with Flagyl. She takes Prilosec PRN for GERD. She has gained 13 lbs over past year. She is taking Lasix for edema. Appetite is good. She is working at a local [employer] place.  Follow Up 3/4/22: Patient presents for routine follow up. She was presented to [place] Tumor board and advised to have TACE/MWA and Y90 for 2 liver lesions. She had both procedures done in Feb 2022. She has done well. Blood work on 3/3/22; AST 19, URIEL 28, , TB 0.4, Cr 3.0 and Hb 8.8. No changes noted. Liver biopsy showed primary HCC.

## 2022-03-11 ENCOUNTER — OFFICE VISIT (OUTPATIENT)
Dept: URBAN - METROPOLITAN AREA CLINIC 54 | Facility: CLINIC | Age: 72
End: 2022-03-11

## 2022-03-11 RX ORDER — FUROSEMIDE 40 MG/1
1 TABLET TABLET ORAL ONCE A DAY
COMMUNITY

## 2022-03-11 RX ORDER — METRONIDAZOLE 500 MG/1
1 TABLET TABLET, FILM COATED ORAL ONCE A DAY
Qty: 90 TABLET | Refills: 3 | COMMUNITY

## 2022-03-11 RX ORDER — DIPHENOXYLATE HYDROCHLORIDE AND ATROPINE SULFATE 2.5; .025 MG/1; MG/1
2 TABLETS AS NEEDED TABLET ORAL
Qty: 120 TABLET | Refills: 1 | COMMUNITY
Start: 2020-06-23

## 2022-03-11 RX ORDER — HYDROXYZINE HYDROCHLORIDE 10 MG/1
2 TABLET, FILM COATED ORAL QHS
COMMUNITY

## 2022-03-11 RX ORDER — LEVOTHYROXINE SODIUM 0.15 MG/1
TABLET ORAL
Qty: 0 | Refills: 0 | COMMUNITY
Start: 1900-01-01

## 2022-04-28 ENCOUNTER — TELEPHONE ENCOUNTER (OUTPATIENT)
Dept: URBAN - METROPOLITAN AREA CLINIC 54 | Facility: CLINIC | Age: 72
End: 2022-04-28

## 2022-08-08 ENCOUNTER — OFFICE VISIT (OUTPATIENT)
Dept: URBAN - METROPOLITAN AREA CLINIC 54 | Facility: CLINIC | Age: 72
End: 2022-08-08

## 2022-08-15 ENCOUNTER — OFFICE VISIT (OUTPATIENT)
Dept: URBAN - METROPOLITAN AREA CLINIC 54 | Facility: CLINIC | Age: 72
End: 2022-08-15
Payer: MEDICARE

## 2022-08-15 VITALS
WEIGHT: 127 LBS | BODY MASS INDEX: 18.81 KG/M2 | TEMPERATURE: 97.3 F | HEART RATE: 79 BPM | SYSTOLIC BLOOD PRESSURE: 99 MMHG | DIASTOLIC BLOOD PRESSURE: 54 MMHG | HEIGHT: 69 IN

## 2022-08-15 DIAGNOSIS — N20.0 KIDNEY STONE: ICD-10-CM

## 2022-08-15 DIAGNOSIS — K59.9 MALDIGESTION SYNDROME: ICD-10-CM

## 2022-08-15 DIAGNOSIS — K52.89 (LYMPHOCYTIC) MICROSCOPIC COLITIS: ICD-10-CM

## 2022-08-15 DIAGNOSIS — N18.4 CKD (CHRONIC KIDNEY DISEASE) STAGE 4, GFR 15-29 ML/MIN: ICD-10-CM

## 2022-08-15 DIAGNOSIS — K74.00 LIVER FIBROSIS: ICD-10-CM

## 2022-08-15 DIAGNOSIS — A04.72 CLOSTRIDIUM DIFFICILE COLITIS: ICD-10-CM

## 2022-08-15 DIAGNOSIS — R89.8 ELEVATED TUMOR MARKERS: ICD-10-CM

## 2022-08-15 DIAGNOSIS — E46 MALNUTRITION, UNSPECIFIED TYPE: ICD-10-CM

## 2022-08-15 DIAGNOSIS — Z98.84 GASTRIC BYPASS STATUS FOR OBESITY: ICD-10-CM

## 2022-08-15 DIAGNOSIS — K60.1 CHRONIC ANAL FISSURE: ICD-10-CM

## 2022-08-15 DIAGNOSIS — K90.2 BLIND LOOP SYNDROME: ICD-10-CM

## 2022-08-15 DIAGNOSIS — K63.89 BACTERIAL OVERGROWTH SYNDROME: ICD-10-CM

## 2022-08-15 PROBLEM — 266468003 CIRRHOSIS - NON-ALCOHOLIC: Status: ACTIVE | Noted: 2021-04-16

## 2022-08-15 PROCEDURE — 99213 OFFICE O/P EST LOW 20 MIN: CPT | Performed by: INTERNAL MEDICINE

## 2022-08-15 RX ORDER — DIPHENOXYLATE HYDROCHLORIDE AND ATROPINE SULFATE 2.5; .025 MG/1; MG/1
2 TABLETS AS NEEDED TABLET ORAL
Qty: 120 TABLET | Refills: 1 | Status: ACTIVE | COMMUNITY
Start: 2020-06-23

## 2022-08-15 RX ORDER — IPRATROPIUM BROMIDE 21 UG/1
2 SPRAYS IN EACH NOSTRIL SPRAY NASAL TWICE A DAY
Status: ACTIVE | COMMUNITY

## 2022-08-15 RX ORDER — METRONIDAZOLE 500 MG/1
1 TABLET TABLET, FILM COATED ORAL ONCE A DAY
Qty: 90 TABLET | Refills: 3 | Status: ACTIVE | COMMUNITY

## 2022-08-15 RX ORDER — HYDROXYZINE HYDROCHLORIDE 10 MG/1
2 TABLET, FILM COATED ORAL QHS
Status: ACTIVE | COMMUNITY

## 2022-08-15 RX ORDER — LEVOTHYROXINE SODIUM 0.15 MG/1
TABLET ORAL
Qty: 0 | Refills: 0 | Status: ACTIVE | COMMUNITY
Start: 1900-01-01

## 2022-08-15 RX ORDER — METRONIDAZOLE 500 MG/1
1 TABLET TABLET, FILM COATED ORAL ONCE A DAY
Qty: 90 TABLET | Refills: 3 | OUTPATIENT

## 2022-08-15 RX ORDER — FUROSEMIDE 40 MG/1
1 TABLET TABLET ORAL ONCE A DAY
Status: ACTIVE | COMMUNITY

## 2022-08-15 RX ORDER — WARFARIN SODIUM 2 MG/1
1 TABLET TABLET ORAL ONCE A DAY
Status: ACTIVE | COMMUNITY

## 2022-08-15 RX ORDER — SERTRALINE 50 MG/1
1 TABLET TABLET, FILM COATED ORAL ONCE A DAY
Status: ACTIVE | COMMUNITY

## 2022-08-15 NOTE — PHYSICAL EXAM CONSTITUTIONAL:
Improved nutritional state , in no acute distress , ambulating without difficulty , normal communication ability

## 2022-08-15 NOTE — HPI-OTHER HISTORIES
This is a follow-up appointment for this patient, a 69 year old /White female, after a previous visit on hospitalization at [facility]. Pt with hx of CKD stage 5, off HD (refused). REcurrent CDI with 3 rounds of  vancomycin. Still with diarrhea. Some improvement with steroids., for an evaluation for diarrhea. The patient    has been experiencing these symptoms for months.  Diagnostic testing includes a flexible sigmoidoscopy. The flexible sigmoidoscopy was performed on 02/23/2020 by Anamika Stockton MD and was normal.    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 on steroid taper  4-2-20 S/p FMT with persistent diarrhea. Pt reports that the bowel movements went up to 14. Pt increased prednisone.  Pt reports that she then went 4-6 per day.  Pt reports 3 stools per day. Pt reports that ID indicated that the patient would not be approved.  Pt reports that  Follow Up 4/20/20: Patient presents for routine post hospitalization telehealth visit. Patient complains of rectal outlet pain which is worse during and after a BM. No abdominal pain. She has used steroid suppositories and NTG/Lidocaine. Diarrhea is ? better with Creon 3K 3 caps with each meal and 2 with snack. FMT did not work. She is using Imodium as needed. Flagyl has been discontinued. Weight is down to 120 lbs. Liver biopsy showed F3 disease.  Follow Up 5/11/20: Patient presents for routine telehealth visit. Patient feels better. She has not taken Creon for 2 weeks due to coverage issues. This made no difference. She tried the 4 caps/meal dose without any significant improvement. However, she wants to try for 2 weeks with drug assistance. She has gained 2 lbs. She is managing symptoms with Imodium. She c/o bloating and gas. She still has rectal pain but improved by 40%.  Follow Up 6/9/20: Patient presents for routine follow up. She continues to have diarrhea and rectal burning despite using the NTG/Lidocaine ointments. She has intermittent incontinence episodes. She was in ER 2 weeks ago for chest pains and dyspnea. She was found to have Hb of 8.4 and received 1  U of PRBC with improvement in symptoms. She had EKG and negative cardiac enzymes. She has MPI in next few weeks. She is back on Creon 3 caps with each meal. She was started on Rifaximin in May 2020 with improvement in bloating.  Follow Up 7/20/20: Patient presents for unexpected visit. She c/o severe rectal pain and swelling which is quite bothersome. She is unable to use NTG/Lidocaine and Sitz baths are not helping. Diarrhea persists. PET NETSPOT CT is pending. She is awaiting [place] appointment.   Follow Up 10/9/20: Patient presents for routine follow up. She saw Dr. Garcia and diaganosed with anal fissure and stenosis. She had Botox and dilation. She is using Sitz baths. She was diagosed with Covid in Aug 2020. Recent blood work showed Cr of 2.6 and Al 2.6 with proteinuria. She is managing her diarrhea with Lomotil and has stopped the Creon.  She is on Rifaximin 550 mg po BID. She has improvement with bloating.  Follow Up 4/16/21: Patient presents for routine follow up. She received 2 units of PRBC's for Hb of 8.2 and iron infusion. She had a interstim device placed in Feb 2021 which has improved her FI. She has excessive flatulence and bloating. She stopped Rifaximin and Creon due to lack of benefit. She is not using any medication for diarrha. She tried Candibactin and FCCidal herbal therapy with no relief.  Follow Up 10/15/21: Patient presents for routine follow up. She has done really well over the past year. Her symptoms are under control with Flagyl. She takes Prilosec PRN for GERD. She has gained 13 lbs over past year. She is taking Lasix for edema. Appetite is good. She is working at a local [employer] place.  Follow Up 3/4/22: Patient presents for routine follow up. She was presented to [place] Tumor board and advised to have TACE/MWA and Y90 for 2 liver lesions. She had both procedures done in Feb 2022. She has done well. Blood work on 3/3/22; AST 19, URIEL 28, , TB 0.4, Cr 3.0 and Hb 8.8. No changes noted. Liver biopsy showed primary HCC.  Follow Up 8/15/22:  Patient presents for routine follow up. Her Y90 was cancelled due to a small new lesion of 9 mm. Repeat imaging in Aug showed growth to 13 mm (LR-5). She his scheduled for ablation later this month. She had COVID July 4th. She was found to be anemic in July 2022 (Hb 5). She was transfused 3 Units of PRBC's. No overt GI bleeding.

## 2022-08-19 PROBLEM — 95570007 KIDNEY STONE: Status: ACTIVE | Noted: 2021-04-16

## 2022-08-19 PROBLEM — 238107002: Status: ACTIVE | Noted: 2021-04-16

## 2022-08-19 PROBLEM — 197152000 CHRONIC ANAL FISSURE: Status: ACTIVE | Noted: 2020-07-20

## 2022-08-19 PROBLEM — 77225009 BLIND LOOP SYNDROME: Status: ACTIVE | Noted: 2021-04-16

## 2022-08-19 PROBLEM — 431857002 CHRONIC KIDNEY DISEASE STAGE 4: Status: ACTIVE | Noted: 2021-04-16

## 2022-08-26 ENCOUNTER — OFFICE VISIT (OUTPATIENT)
Dept: URBAN - METROPOLITAN AREA CLINIC 54 | Facility: CLINIC | Age: 72
End: 2022-08-26

## 2023-01-09 ENCOUNTER — TELEPHONE ENCOUNTER (OUTPATIENT)
Dept: URBAN - METROPOLITAN AREA CLINIC 54 | Facility: CLINIC | Age: 73
End: 2023-01-09

## 2023-02-03 ENCOUNTER — OFFICE VISIT (OUTPATIENT)
Dept: URBAN - NONMETROPOLITAN AREA CLINIC 4 | Facility: CLINIC | Age: 73
End: 2023-02-03

## 2023-02-03 RX ORDER — IPRATROPIUM BROMIDE 21 UG/1
2 SPRAYS IN EACH NOSTRIL SPRAY NASAL TWICE A DAY
Status: ACTIVE | COMMUNITY

## 2023-02-03 RX ORDER — HYDROXYZINE HYDROCHLORIDE 10 MG/1
2 TABLET, FILM COATED ORAL QHS
Status: ACTIVE | COMMUNITY

## 2023-02-03 RX ORDER — DIPHENOXYLATE HYDROCHLORIDE AND ATROPINE SULFATE 2.5; .025 MG/1; MG/1
2 TABLETS AS NEEDED TABLET ORAL
Qty: 120 TABLET | Refills: 1 | Status: ACTIVE | COMMUNITY
Start: 2020-06-23

## 2023-02-03 RX ORDER — LEVOTHYROXINE SODIUM 0.15 MG/1
TABLET ORAL
Qty: 0 | Refills: 0 | Status: ACTIVE | COMMUNITY
Start: 1900-01-01

## 2023-02-03 RX ORDER — FUROSEMIDE 40 MG/1
1 TABLET TABLET ORAL ONCE A DAY
Status: ACTIVE | COMMUNITY

## 2023-02-03 RX ORDER — SERTRALINE 50 MG/1
1 TABLET TABLET, FILM COATED ORAL ONCE A DAY
Status: ACTIVE | COMMUNITY

## 2023-02-03 RX ORDER — METRONIDAZOLE 500 MG/1
1 TABLET TABLET, FILM COATED ORAL ONCE A DAY
Qty: 90 TABLET | Refills: 3 | Status: ACTIVE | COMMUNITY

## 2023-02-03 RX ORDER — WARFARIN SODIUM 2 MG/1
1 TABLET TABLET ORAL ONCE A DAY
Status: ACTIVE | COMMUNITY

## 2023-02-20 ENCOUNTER — TELEPHONE ENCOUNTER (OUTPATIENT)
Dept: URBAN - METROPOLITAN AREA CLINIC 54 | Facility: CLINIC | Age: 73
End: 2023-02-20

## 2023-03-24 ENCOUNTER — OFFICE VISIT (OUTPATIENT)
Dept: URBAN - METROPOLITAN AREA CLINIC 54 | Facility: CLINIC | Age: 73
End: 2023-03-24

## 2023-03-27 ENCOUNTER — LAB OUTSIDE AN ENCOUNTER (OUTPATIENT)
Dept: URBAN - METROPOLITAN AREA CLINIC 54 | Facility: CLINIC | Age: 73
End: 2023-03-27

## 2023-03-27 ENCOUNTER — OFFICE VISIT (OUTPATIENT)
Dept: URBAN - METROPOLITAN AREA CLINIC 54 | Facility: CLINIC | Age: 73
End: 2023-03-27
Payer: MEDICARE

## 2023-03-27 VITALS
WEIGHT: 129 LBS | HEIGHT: 69 IN | DIASTOLIC BLOOD PRESSURE: 70 MMHG | BODY MASS INDEX: 19.11 KG/M2 | SYSTOLIC BLOOD PRESSURE: 121 MMHG | HEART RATE: 81 BPM | TEMPERATURE: 97.3 F

## 2023-03-27 DIAGNOSIS — N18.4 CKD (CHRONIC KIDNEY DISEASE) STAGE 4, GFR 15-29 ML/MIN: ICD-10-CM

## 2023-03-27 DIAGNOSIS — K90.2 BLIND LOOP SYNDROME: ICD-10-CM

## 2023-03-27 DIAGNOSIS — E46 MALNUTRITION, UNSPECIFIED TYPE: ICD-10-CM

## 2023-03-27 DIAGNOSIS — N20.0 KIDNEY STONE: ICD-10-CM

## 2023-03-27 DIAGNOSIS — K52.9 CHRONIC DIARRHEA: ICD-10-CM

## 2023-03-27 DIAGNOSIS — K60.1 CHRONIC ANAL FISSURE: ICD-10-CM

## 2023-03-27 DIAGNOSIS — R89.8 ELEVATED TUMOR MARKERS: ICD-10-CM

## 2023-03-27 DIAGNOSIS — Z98.84 GASTRIC BYPASS STATUS FOR OBESITY: ICD-10-CM

## 2023-03-27 DIAGNOSIS — K74.00 FIBROSIS OF LIVER: ICD-10-CM

## 2023-03-27 DIAGNOSIS — K74.0 LIVER FIBROSIS: ICD-10-CM

## 2023-03-27 DIAGNOSIS — K59.9 MALDIGESTION SYNDROME: ICD-10-CM

## 2023-03-27 DIAGNOSIS — R19.7 ACUTE DIARRHEA: ICD-10-CM

## 2023-03-27 DIAGNOSIS — K63.89 BACTERIAL OVERGROWTH SYNDROME: ICD-10-CM

## 2023-03-27 DIAGNOSIS — A04.72 CLOSTRIDIUM DIFFICILE COLITIS: ICD-10-CM

## 2023-03-27 PROBLEM — 109841003: Status: ACTIVE | Noted: 2022-03-04

## 2023-03-27 PROCEDURE — 99214 OFFICE O/P EST MOD 30 MIN: CPT | Performed by: INTERNAL MEDICINE

## 2023-03-27 RX ORDER — METRONIDAZOLE 500 MG/1
1 TABLET TABLET, FILM COATED ORAL ONCE A DAY
Qty: 90 TABLET | Refills: 3 | OUTPATIENT

## 2023-03-27 RX ORDER — FUROSEMIDE 40 MG/1
1 TABLET TABLET ORAL ONCE A DAY
Status: ACTIVE | COMMUNITY

## 2023-03-27 RX ORDER — HYDROXYZINE HYDROCHLORIDE 10 MG/1
2 TABLET, FILM COATED ORAL QHS
Status: ACTIVE | COMMUNITY

## 2023-03-27 RX ORDER — GABAPENTIN 800 MG/1
2 TABLET TABLET, FILM COATED ORAL ONCE A DAY
Refills: 0 | Status: ACTIVE | COMMUNITY
Start: 1900-01-01

## 2023-03-27 RX ORDER — WARFARIN SODIUM 2 MG/1
1 TABLET TABLET ORAL ONCE A DAY
Status: ACTIVE | COMMUNITY

## 2023-03-27 RX ORDER — IPRATROPIUM BROMIDE 21 UG/1
2 SPRAYS IN EACH NOSTRIL SPRAY NASAL TWICE A DAY
Status: ACTIVE | COMMUNITY

## 2023-03-27 RX ORDER — COPPER GLUCONATE 2 MG
2 TABLET TABLET ORAL ONCE A DAY
Refills: 0 | Status: ACTIVE | COMMUNITY
Start: 1900-01-01

## 2023-03-27 RX ORDER — ALLOPURINOL 300 MG/1
1 TABLET TABLET ORAL ONCE A DAY
Refills: 0 | Status: ACTIVE | COMMUNITY
Start: 1900-01-01

## 2023-03-27 RX ORDER — LEVOTHYROXINE SODIUM 125 UG/1
1 TABLET IN THE MORNING ON AN EMPTY STOMACH TABLET ORAL ONCE A DAY
Refills: 0 | Status: ACTIVE | COMMUNITY
Start: 1900-01-01

## 2023-03-27 RX ORDER — METRONIDAZOLE 500 MG/1
1 TABLET TABLET, FILM COATED ORAL ONCE A DAY
Qty: 90 TABLET | Refills: 3 | Status: ACTIVE | COMMUNITY

## 2023-03-27 NOTE — HPI-OTHER HISTORIES
This is a follow-up appointment for this patient, a 69 year old /White female, after a previous visit on hospitalization at [facility]. Pt with hx of CKD stage 5, off HD (refused). REcurrent CDI with 3 rounds of  vancomycin. Still with diarrhea. Some improvement with steroids., for an evaluation for diarrhea. The patient    has been experiencing these symptoms for months.  Diagnostic testing includes a flexible sigmoidoscopy. The flexible sigmoidoscopy was performed on 02/23/2020 by Anamika Stockton MD and was normal.    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 on steroid taper  4-2-20 S/p FMT with persistent diarrhea. Pt reports that the bowel movements went up to 14. Pt increased prednisone.  Pt reports that she then went 4-6 per day.  Pt reports 3 stools per day. Pt reports that ID indicated that the patient would not be approved.  Pt reports that  Follow Up 4/20/20: Patient presents for routine post hospitalization telehealth visit. Patient complains of rectal outlet pain which is worse during and after a BM. No abdominal pain. She has used steroid suppositories and NTG/Lidocaine. Diarrhea is ? better with Creon 3K 3 caps with each meal and 2 with snack. FMT did not work. She is using Imodium as needed. Flagyl has been discontinued. Weight is down to 120 lbs. Liver biopsy showed F3 disease.  Follow Up 5/11/20: Patient presents for routine telehealth visit. Patient feels better. She has not taken Creon for 2 weeks due to coverage issues. This made no difference. She tried the 4 caps/meal dose without any significant improvement. However, she wants to try for 2 weeks with drug assistance. She has gained 2 lbs. She is managing symptoms with Imodium. She c/o bloating and gas. She still has rectal pain but improved by 40%.  Follow Up 6/9/20: Patient presents for routine follow up. She continues to have diarrhea and rectal burning despite using the NTG/Lidocaine ointments. She has intermittent incontinence episodes. She was in ER 2 weeks ago for chest pains and dyspnea. She was found to have Hb of 8.4 and received 1  U of PRBC with improvement in symptoms. She had EKG and negative cardiac enzymes. She has MPI in next few weeks. She is back on Creon 3 caps with each meal. She was started on Rifaximin in May 2020 with improvement in bloating.  Follow Up 7/20/20: Patient presents for unexpected visit. She c/o severe rectal pain and swelling which is quite bothersome. She is unable to use NTG/Lidocaine and Sitz baths are not helping. Diarrhea persists. PET NETSPOT CT is pending. She is awaiting [place] appointment.   Follow Up 10/9/20: Patient presents for routine follow up. She saw Dr. Garcia and diaganosed with anal fissure and stenosis. She had Botox and dilation. She is using Sitz baths. She was diagosed with Covid in Aug 2020. Recent blood work showed Cr of 2.6 and Al 2.6 with proteinuria. She is managing her diarrhea with Lomotil and has stopped the Creon.  She is on Rifaximin 550 mg po BID. She has improvement with bloating.  Follow Up 4/16/21: Patient presents for routine follow up. She received 2 units of PRBC's for Hb of 8.2 and iron infusion. She had a interstim device placed in Feb 2021 which has improved her FI. She has excessive flatulence and bloating. She stopped Rifaximin and Creon due to lack of benefit. She is not using any medication for diarrha. She tried Candibactin and FCCidal herbal therapy with no relief.  Follow Up 10/15/21: Patient presents for routine follow up. She has done really well over the past year. Her symptoms are under control with Flagyl. She takes Prilosec PRN for GERD. She has gained 13 lbs over past year. She is taking Lasix for edema. Appetite is good. She is working at a local [employer] place.  Follow Up 3/4/22: Patient presents for routine follow up. She was presented to [facility] board and advised to have TACE/MWA and Y90 for 2 liver lesions. She had both procedures done in Feb 2022. She has done well. Blood work on 3/3/22; AST 19, URIEL 28, , TB 0.4, Cr 3.0 and Hb 8.8. No changes noted. Liver biopsy showed primary HCC.  Follow Up 8/15/22:  Patient presents for routine follow up. Her Y90 was cancelled due to a small new lesion of 9 mm. Repeat imaging in Aug showed growth to 13 mm (LR-5). She his scheduled for ablation later this month. She had COVID July 4th. She was found to be anemic in July 2022 (Hb 5). She was transfused 3 Units of PRBC's. No overt GI bleeding.  Follow Up 3/27/23: Patinent presents for routine follow up. She had 2 Units of blood last week for a Hb of 8 gm/dl. She is scheduled for iron infusion. 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 Follow up imaging in Feb 2023 showed good response. CT chest showed a 1.5 cm LN next to HH and 3 small pulmonary nodules (< 3 mm in size).

## 2023-03-28 ENCOUNTER — TELEPHONE ENCOUNTER (OUTPATIENT)
Dept: URBAN - METROPOLITAN AREA CLINIC 35 | Facility: CLINIC | Age: 73
End: 2023-03-28

## 2023-03-28 LAB
A/G RATIO: 2.1
ABSOLUTE BASOPHILS: 22
ABSOLUTE EOSINOPHILS: 194
ABSOLUTE LYMPHOCYTES: 805
ABSOLUTE MONOCYTES: 414
ABSOLUTE NEUTROPHILS: 2966
AFP, SERUM, TUMOR MARKER: 12.7
ALBUMIN: 3.6
ALKALINE PHOSPHATASE: 81
ALT (SGPT): 13
AST (SGOT): 18
BASOPHILS: 0.5
BILIRUBIN, TOTAL: 0.3
BUN/CREATININE RATIO: 18
BUN: 48
CALCIUM: 8.7
CARBON DIOXIDE, TOTAL: 19
CHLORIDE: 112
CREATININE: 2.64
EGFR: 19
EOSINOPHILS: 4.4
GLOBULIN, TOTAL: 1.7
GLUCOSE: 87
HEMATOCRIT: 34.4
HEMOGLOBIN: 11.2
INR: 2.4
LYMPHOCYTES: 18.3
MCH: 29.3
MCHC: 32.6
MCV: 90.1
MONOCYTES: 9.4
MPV: 12.3
NEUTROPHILS: 67.4
PLATELET COUNT: 163
POTASSIUM: 4.9
PROTEIN, TOTAL: 5.3
PT: 23.6
RDW: 14.2
RED BLOOD CELL COUNT: 3.82
SODIUM: 142
WHITE BLOOD CELL COUNT: 4.4

## 2023-04-05 ENCOUNTER — TELEPHONE ENCOUNTER (OUTPATIENT)
Dept: URBAN - METROPOLITAN AREA CLINIC 54 | Facility: CLINIC | Age: 73
End: 2023-04-05

## 2023-08-23 ENCOUNTER — DASHBOARD ENCOUNTERS (OUTPATIENT)
Age: 73
End: 2023-08-23

## 2023-08-23 ENCOUNTER — OFFICE VISIT (OUTPATIENT)
Dept: URBAN - METROPOLITAN AREA CLINIC 54 | Facility: CLINIC | Age: 73
End: 2023-08-23
Payer: MEDICARE

## 2023-08-23 VITALS
DIASTOLIC BLOOD PRESSURE: 50 MMHG | BODY MASS INDEX: 17.39 KG/M2 | HEIGHT: 69 IN | SYSTOLIC BLOOD PRESSURE: 79 MMHG | TEMPERATURE: 98.8 F | WEIGHT: 117.4 LBS

## 2023-08-23 DIAGNOSIS — K63.89 BACTERIAL OVERGROWTH SYNDROME: ICD-10-CM

## 2023-08-23 DIAGNOSIS — C22.0 CANCER, HEPATOCELLULAR: ICD-10-CM

## 2023-08-23 PROCEDURE — 99215 OFFICE O/P EST HI 40 MIN: CPT | Performed by: PHYSICIAN ASSISTANT

## 2023-08-23 RX ORDER — IPRATROPIUM BROMIDE 21 UG/1
2 SPRAYS IN EACH NOSTRIL SPRAY NASAL TWICE A DAY
Status: ACTIVE | COMMUNITY

## 2023-08-23 RX ORDER — WARFARIN SODIUM 2 MG/1
1 TABLET TABLET ORAL ONCE A DAY
Status: ACTIVE | COMMUNITY

## 2023-08-23 RX ORDER — ALLOPURINOL 300 MG/1
1 TABLET TABLET ORAL ONCE A DAY
Refills: 0 | Status: ACTIVE | COMMUNITY
Start: 1900-01-01

## 2023-08-23 RX ORDER — PHENOL 1.4 %
1 TABLET WITH FOOD AEROSOL, SPRAY (ML) MUCOUS MEMBRANE TWICE A DAY
Status: ACTIVE | COMMUNITY

## 2023-08-23 RX ORDER — METRONIDAZOLE 500 MG/1
1 TABLET TABLET, FILM COATED ORAL ONCE A DAY
Qty: 90 TABLET | Refills: 3 | OUTPATIENT

## 2023-08-23 RX ORDER — FUROSEMIDE 40 MG/1
1 TABLET TABLET ORAL ONCE A DAY
Status: ACTIVE | COMMUNITY

## 2023-08-23 RX ORDER — MIDODRINE HYDROCHLORIDE 5 MG/1
1 TABLET TABLET ORAL TWICE A DAY
Status: ACTIVE | COMMUNITY

## 2023-08-23 RX ORDER — SODIUM BICARBONATE TAB 650 MG 650 MG
AS DIRECTED TAB ORAL
Status: ACTIVE | COMMUNITY

## 2023-08-23 RX ORDER — HYDROXYZINE HYDROCHLORIDE 10 MG/1
2 TABLET, FILM COATED ORAL QHS
Status: ACTIVE | COMMUNITY

## 2023-08-23 RX ORDER — METRONIDAZOLE 500 MG/1
1 TABLET TABLET, FILM COATED ORAL ONCE A DAY
Qty: 90 TABLET | Refills: 3 | Status: ACTIVE | COMMUNITY

## 2023-08-23 RX ORDER — FERROUS SULFATE 325(65) MG
1 TABLET TABLET ORAL
Refills: 0 | Status: ACTIVE | COMMUNITY
Start: 1900-01-01

## 2023-08-23 RX ORDER — LEVOTHYROXINE SODIUM 125 UG/1
1 TABLET IN THE MORNING ON AN EMPTY STOMACH TABLET ORAL ONCE A DAY
Refills: 0 | Status: ACTIVE | COMMUNITY
Start: 1900-01-01

## 2023-08-23 RX ORDER — COPPER GLUCONATE 2 MG
2 TABLET TABLET ORAL ONCE A DAY
Refills: 0 | Status: ACTIVE | COMMUNITY
Start: 1900-01-01

## 2023-08-23 RX ORDER — TRAZODONE HYDROCHLORIDE 150 MG/1
1 TABLET AT BEDTIME TABLET ORAL ONCE A DAY
Status: ACTIVE | COMMUNITY

## 2023-08-23 NOTE — PHYSICAL EXAM CONSTITUTIONAL:
well developed, frail appearing, in no acute distress , ambulating without difficulty , normal communication ability

## 2023-08-23 NOTE — HPI-OTHER HISTORIES
This is a follow-up appointment for this patient, a 69 year old /White female, after a previous visit on hospitalization at [facility]. Pt with hx of CKD stage 5, off HD (refused). REcurrent CDI with 3 rounds of  vancomycin. Still with diarrhea. Some improvement with steroids., for an evaluation for diarrhea. The patient    has been experiencing these symptoms for months.  Diagnostic testing includes a flexible sigmoidoscopy. The flexible sigmoidoscopy was performed on 02/23/2020 by Anamika Stockton MD and was normal.    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 on steroid taper  4-2-20 S/p FMT with persistent diarrhea. Pt reports that the bowel movements went up to 14. Pt increased prednisone.  Pt reports that she then went 4-6 per day.  Pt reports 3 stools per day. Pt reports that ID indicated that the patient would not be approved.  Pt reports that  Follow Up 4/20/20: Patient presents for routine post hospitalization telehealth visit. Patient complains of rectal outlet pain which is worse during and after a BM. No abdominal pain. She has used steroid suppositories and NTG/Lidocaine. Diarrhea is ? better with Creon 3K 3 caps with each meal and 2 with snack. FMT did not work. She is using Imodium as needed. Flagyl has been discontinued. Weight is down to 120 lbs. Liver biopsy showed F3 disease.  Follow Up 5/11/20: Patient presents for routine telehealth visit. Patient feels better. She has not taken Creon for 2 weeks due to coverage issues. This made no difference. She tried the 4 caps/meal dose without any significant improvement. However, she wants to try for 2 weeks with drug assistance. She has gained 2 lbs. She is managing symptoms with Imodium. She c/o bloating and gas. She still has rectal pain but improved by 40%.  Follow Up 6/9/20: Patient presents for routine follow up. She continues to have diarrhea and rectal burning despite using the NTG/Lidocaine ointments. She has intermittent incontinence episodes. She was in ER 2 weeks ago for chest pains and dyspnea. She was found to have Hb of 8.4 and received 1  U of PRBC with improvement in symptoms. She had EKG and negative cardiac enzymes. She has MPI in next few weeks. She is back on Creon 3 caps with each meal. She was started on Rifaximin in May 2020 with improvement in bloating.  Follow Up 7/20/20: Patient presents for unexpected visit. She c/o severe rectal pain and swelling which is quite bothersome. She is unable to use NTG/Lidocaine and Sitz baths are not helping. Diarrhea persists. PET NETSPOT CT is pending. She is awaiting [place] appointment.   Follow Up 10/9/20: Patient presents for routine follow up. She saw Dr. Garcia and diaganosed with anal fissure and stenosis. She had Botox and dilation. She is using Sitz baths. She was diagosed with Covid in Aug 2020. Recent blood work showed Cr of 2.6 and Al 2.6 with proteinuria. She is managing her diarrhea with Lomotil and has stopped the Creon.  She is on Rifaximin 550 mg po BID. She has improvement with bloating.  Follow Up 4/16/21: Patient presents for routine follow up. She received 2 units of PRBC's for Hb of 8.2 and iron infusion. She had a interstim device placed in Feb 2021 which has improved her FI. She has excessive flatulence and bloating. She stopped Rifaximin and Creon due to lack of benefit. She is not using any medication for diarrha. She tried Candibactin and FCCidal herbal therapy with no relief.  Follow Up 10/15/21: Patient presents for routine follow up. She has done really well over the past year. Her symptoms are under control with Flagyl. She takes Prilosec PRN for GERD. She has gained 13 lbs over past year. She is taking Lasix for edema. Appetite is good. She is working at a local [employer] place.  Follow Up 3/4/22: Patient presents for routine follow up. She was presented to [place] [facility] board and advised to have TACE/MWA and Y90 for 2 liver lesions. She had both procedures done in Feb 2022. She has done well. Blood work on 3/3/22; AST 19, URIEL 28, , TB 0.4, Cr 3.0 and Hb 8.8. No changes noted. Liver biopsy showed primary HCC.  Follow Up 8/15/22:  Patient presents for routine follow up. Her Y90 was cancelled due to a small new lesion of 9 mm. Repeat imaging in Aug showed growth to 13 mm (LR-5). She his scheduled for ablation later this month. She had COVID July 4th. She was found to be anemic in July 2022 (Hb 5). She was transfused 3 Units of PRBC's. No overt GI bleeding.  Follow Up 3/27/23: Patinent presents for routine follow up. She had 2 Units of blood last week for a Hb of 8 gm/dl. She is scheduled for iron infusion. 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 Follow up imaging in Feb 2023 showed good response. CT chest showed a 1.5 cm LN next to HH and 3 small pulmonary nodules (< 3 mm in size).  Follow Up 8/23/23: Patient here for routine follow up. Recent MRI abdomen and CT chest shows stable liver lesions without new suspicious lesions. Enlarged lymph node near distal thoracic esophagus measuring  up to 1.8 cm is increased in size from CT chest February 14, 2023 noted at 1.5 cm. Lung nodules x3 stable at 3 mm. Patient reports increase in generalized fatigue, weight loss, lower BP and on midodrine BID.

## 2023-08-24 LAB
A/G RATIO: 1.8
ABSOLUTE BASOPHILS: 30
ABSOLUTE EOSINOPHILS: 70
ABSOLUTE LYMPHOCYTES: 925
ABSOLUTE MONOCYTES: 410
ABSOLUTE NEUTROPHILS: 3565
AFP, SERUM, TUMOR MARKER: 13.1
ALBUMIN: 3.7
ALKALINE PHOSPHATASE: 76
ALT (SGPT): 11
AST (SGOT): 14
BASOPHILS: 0.6
BILIRUBIN, TOTAL: 0.3
BUN/CREATININE RATIO: 16
BUN: 40
CALCIUM: 9.1
CARBON DIOXIDE, TOTAL: 24
CHLORIDE: 105
CREATININE: 2.47
EGFR: 20
EOSINOPHILS: 1.4
GLOBULIN, TOTAL: 2.1
GLUCOSE: 94
HEMATOCRIT: 28.2
HEMOGLOBIN: 9.6
INR: 1
LYMPHOCYTES: 18.5
MCH: 32.4
MCHC: 34
MCV: 95.3
MONOCYTES: 8.2
MPV: 13
NEUTROPHILS: 71.3
PLATELET COUNT: 161
POTASSIUM: 4.9
PROTEIN, TOTAL: 5.8
PT: 11
RDW: 13.4
RED BLOOD CELL COUNT: 2.96
SODIUM: 140
WHITE BLOOD CELL COUNT: 5

## 2023-08-25 ENCOUNTER — TELEPHONE ENCOUNTER (OUTPATIENT)
Dept: URBAN - METROPOLITAN AREA CLINIC 54 | Facility: CLINIC | Age: 73
End: 2023-08-25

## 2023-08-28 ENCOUNTER — TELEPHONE ENCOUNTER (OUTPATIENT)
Dept: URBAN - METROPOLITAN AREA CLINIC 105 | Facility: CLINIC | Age: 73
End: 2023-08-28

## 2023-08-28 ENCOUNTER — LAB OUTSIDE AN ENCOUNTER (OUTPATIENT)
Dept: URBAN - METROPOLITAN AREA CLINIC 105 | Facility: CLINIC | Age: 73
End: 2023-08-28

## 2023-09-07 ENCOUNTER — TELEPHONE ENCOUNTER (OUTPATIENT)
Dept: URBAN - METROPOLITAN AREA CLINIC 54 | Facility: CLINIC | Age: 73
End: 2023-09-07

## 2023-09-11 ENCOUNTER — OFFICE VISIT (OUTPATIENT)
Dept: URBAN - METROPOLITAN AREA CLINIC 54 | Facility: CLINIC | Age: 73
End: 2023-09-11

## 2023-10-19 ENCOUNTER — OFFICE VISIT (OUTPATIENT)
Dept: URBAN - METROPOLITAN AREA MEDICAL CENTER 33 | Facility: MEDICAL CENTER | Age: 73
End: 2023-10-19

## 2023-10-19 RX ORDER — LEVOTHYROXINE SODIUM 125 UG/1
1 TABLET IN THE MORNING ON AN EMPTY STOMACH TABLET ORAL ONCE A DAY
Refills: 0 | Status: ACTIVE | COMMUNITY
Start: 1900-01-01

## 2023-10-19 RX ORDER — IPRATROPIUM BROMIDE 21 UG/1
2 SPRAYS IN EACH NOSTRIL SPRAY NASAL TWICE A DAY
Status: ACTIVE | COMMUNITY

## 2023-10-19 RX ORDER — WARFARIN SODIUM 2 MG/1
1 TABLET TABLET ORAL ONCE A DAY
Status: ACTIVE | COMMUNITY

## 2023-10-19 RX ORDER — TRAZODONE HYDROCHLORIDE 150 MG/1
1 TABLET AT BEDTIME TABLET ORAL ONCE A DAY
Status: ACTIVE | COMMUNITY

## 2023-10-19 RX ORDER — SODIUM BICARBONATE TAB 650 MG 650 MG
AS DIRECTED TAB ORAL
Status: ACTIVE | COMMUNITY

## 2023-10-19 RX ORDER — COPPER GLUCONATE 2 MG
2 TABLET TABLET ORAL ONCE A DAY
Refills: 0 | Status: ACTIVE | COMMUNITY
Start: 1900-01-01

## 2023-10-19 RX ORDER — METRONIDAZOLE 500 MG/1
1 TABLET TABLET, FILM COATED ORAL ONCE A DAY
Qty: 90 TABLET | Refills: 3 | Status: ACTIVE | COMMUNITY

## 2023-10-19 RX ORDER — FERROUS SULFATE 325(65) MG
1 TABLET TABLET ORAL
Refills: 0 | Status: ACTIVE | COMMUNITY
Start: 1900-01-01

## 2023-10-19 RX ORDER — PHENOL 1.4 %
1 TABLET WITH FOOD AEROSOL, SPRAY (ML) MUCOUS MEMBRANE TWICE A DAY
Status: ACTIVE | COMMUNITY

## 2023-10-19 RX ORDER — ALLOPURINOL 300 MG/1
1 TABLET TABLET ORAL ONCE A DAY
Refills: 0 | Status: ACTIVE | COMMUNITY
Start: 1900-01-01

## 2023-10-19 RX ORDER — MIDODRINE HYDROCHLORIDE 5 MG/1
1 TABLET TABLET ORAL TWICE A DAY
Status: ACTIVE | COMMUNITY

## 2023-10-19 RX ORDER — HYDROXYZINE HYDROCHLORIDE 10 MG/1
2 TABLET, FILM COATED ORAL QHS
Status: ACTIVE | COMMUNITY

## 2023-10-19 RX ORDER — FUROSEMIDE 40 MG/1
1 TABLET TABLET ORAL ONCE A DAY
Status: ACTIVE | COMMUNITY

## 2023-10-23 ENCOUNTER — CLAIMS CREATED FROM THE CLAIM WINDOW (OUTPATIENT)
Dept: URBAN - METROPOLITAN AREA MEDICAL CENTER 33 | Facility: MEDICAL CENTER | Age: 73
End: 2023-10-23

## 2023-10-23 ENCOUNTER — CLAIMS CREATED FROM THE CLAIM WINDOW (OUTPATIENT)
Dept: URBAN - METROPOLITAN AREA MEDICAL CENTER 33 | Facility: MEDICAL CENTER | Age: 73
End: 2023-10-23
Payer: MEDICARE

## 2023-10-23 DIAGNOSIS — C22.0 CANCER, HEPATOCELLULAR: ICD-10-CM

## 2023-10-23 DIAGNOSIS — K74.69 CIRRHOSIS, CRYPTOGENIC: ICD-10-CM

## 2023-10-23 DIAGNOSIS — C77.1 METASTASIS TO MEDIASTINAL LYMPH NODE: ICD-10-CM

## 2023-10-23 DIAGNOSIS — J98.59 LESION OF MEDIASTINUM: ICD-10-CM

## 2023-10-23 DIAGNOSIS — R93.3 ABN FINDINGS-GI TRACT: ICD-10-CM

## 2023-10-23 PROCEDURE — 99253 IP/OBS CNSLTJ NEW/EST LOW 45: CPT | Performed by: INTERNAL MEDICINE

## 2023-10-23 PROCEDURE — 43238 EGD US FINE NEEDLE BX/ASPIR: CPT | Performed by: INTERNAL MEDICINE

## 2023-10-23 PROCEDURE — 43242 EGD US FINE NEEDLE BX/ASPIR: CPT | Performed by: INTERNAL MEDICINE

## 2023-10-23 PROCEDURE — G8427 DOCREV CUR MEDS BY ELIG CLIN: HCPCS | Performed by: INTERNAL MEDICINE

## 2023-10-23 PROCEDURE — 99221 1ST HOSP IP/OBS SF/LOW 40: CPT | Performed by: INTERNAL MEDICINE

## 2023-10-24 ENCOUNTER — CLAIMS CREATED FROM THE CLAIM WINDOW (OUTPATIENT)
Dept: URBAN - METROPOLITAN AREA MEDICAL CENTER 33 | Facility: MEDICAL CENTER | Age: 73
End: 2023-10-24

## 2023-10-24 ENCOUNTER — CLAIMS CREATED FROM THE CLAIM WINDOW (OUTPATIENT)
Dept: URBAN - METROPOLITAN AREA MEDICAL CENTER 33 | Facility: MEDICAL CENTER | Age: 73
End: 2023-10-24
Payer: MEDICARE

## 2023-10-24 DIAGNOSIS — K74.69 CIRRHOSIS, CRYPTOGENIC: ICD-10-CM

## 2023-10-24 DIAGNOSIS — J98.59 LESION OF MEDIASTINUM: ICD-10-CM

## 2023-10-24 DIAGNOSIS — R13.10 ABNORMAL DEGLUTITION: ICD-10-CM

## 2023-10-24 DIAGNOSIS — K86.89 ACUTE PANCREATIC FLUID COLLECTION: ICD-10-CM

## 2023-10-24 PROCEDURE — 99232 SBSQ HOSP IP/OBS MODERATE 35: CPT | Performed by: INTERNAL MEDICINE

## 2023-10-24 PROCEDURE — 99232 SBSQ HOSP IP/OBS MODERATE 35: CPT | Performed by: PHYSICIAN ASSISTANT

## 2023-10-30 ENCOUNTER — OFFICE VISIT (OUTPATIENT)
Dept: URBAN - METROPOLITAN AREA CLINIC 54 | Facility: CLINIC | Age: 73
End: 2023-10-30